# Patient Record
Sex: MALE | Race: WHITE | Employment: FULL TIME | URBAN - METROPOLITAN AREA
[De-identification: names, ages, dates, MRNs, and addresses within clinical notes are randomized per-mention and may not be internally consistent; named-entity substitution may affect disease eponyms.]

---

## 2020-01-13 ENCOUNTER — OFFICE VISIT (OUTPATIENT)
Dept: ONCOLOGY | Age: 60
End: 2020-01-13

## 2020-01-13 VITALS
TEMPERATURE: 98 F | RESPIRATION RATE: 18 BRPM | DIASTOLIC BLOOD PRESSURE: 77 MMHG | HEART RATE: 52 BPM | SYSTOLIC BLOOD PRESSURE: 122 MMHG | OXYGEN SATURATION: 99 % | WEIGHT: 160 LBS

## 2020-01-13 DIAGNOSIS — R59.0 AXILLARY ADENOPATHY: Primary | ICD-10-CM

## 2020-01-13 RX ORDER — SIMVASTATIN 20 MG/1
TABLET, FILM COATED ORAL
COMMUNITY
Start: 2019-12-24

## 2020-01-13 NOTE — PROGRESS NOTES
[facility]  [address] [facility], [address] [place] [address]  Satish Aryan: [phone]  F: [phone]      Reason for Visit:   Barbara Yi is a 61 y.o. male who is seen as a self referral for evaluation of axillary adenopathy. History of Present Illness:   Barbara Yi is a pleasant 61 y.o. male who presents today for evaluation of axillary adenopathy. He reports first noticing a lump in his right axilla about a month ago. It has remained stable in size since that time, about the size of a marble. Not painful or tender. He denies other adenopathy. He denies other symptoms. He feels well, good energy, no pain. He denies history of skin cancer, denies any concerning lesions on his skin. Denies recent infections. He did have shoulder surgery about 7 months ago. Paternal GF  of lymphoma. Maternal GF had myeloma. He is unaccompanied today. 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 His wife  2 years ago from uterine sarcoma. Past Medical History:   Diagnosis Date    HLD (hyperlipidemia)       Past Surgical History:   Procedure Laterality Date    HX SHOULDER ARTHROSCOPY Right 2019      Social History     Tobacco Use    Smoking status: Never Smoker    Smokeless tobacco: Never Used   Substance Use Topics    Alcohol use: Yes     Frequency: 2-3 times a week      Family History   Problem Relation Age of Onset    Heart Disease Father     Hypertension Father     Stroke Father     Cancer Maternal Grandfather         multiple myeloma    Cancer Paternal Grandmother         lyphoma     Current Outpatient Medications   Medication Sig    simvastatin (ZOCOR) 20 mg tablet      No current facility-administered medications for this visit.        Allergies   Allergen Reactions    Sulfa (Sulfonamide Antibiotics) Rash        Review of Systems: A complete review of systems was obtained, negative except as described above.    Physical Exam:     Visit Vitals  /77 (BP 1 Location: Right arm, BP Patient Position: Sitting)   Pulse (!) 52   Temp 98 °F (36.7 °C) (Temporal)   Resp 18   Wt 160 lb (72.6 kg)   SpO2 99%     General: No distress  Eyes: PERRLA, anicteric sclerae  HENT: Atraumatic, OP clear  Neck: Supple  Lymphatic: No cervical, supraclavicular, or inguinal adenopathy. Right axilla with 1cm nodule, nontender. Respiratory: CTAB, normal respiratory effort  CV: Normal rate, regular rhythm, no murmurs, no peripheral edema  GI: Soft, nontender, nondistended, no masses, no hepatomegaly, no splenomegaly  MS: Normal gait and station. Digits without clubbing or cyanosis. Skin: No rashes, ecchymoses, or petechiae. Normal temperature, turgor, and texture. Psych: Alert, oriented, appropriate affect, normal judgment/insight  Breast exam: No breast masses in bilateral breasts    Results:     N/A    Assessment:   1) Axillary adenopathy  Uncertain etiology. Possibly reactive, given his prior shoulder surgery, but I would have expected some improvement this far out. This is a typical drainage site of the breast, thoracic wall, and arm, and he has no obvious abnormalities of those areas. I recommend we start by obtaining an US of his axilla, along with an US guided needle biopsy. Based on this, we can consider further testing. He reports fairly recent CBC and chemistry panel with his PCP, which I will request.    Plan:     · Request records from PCP  · US axilla  · US guided biopsy of axillary node  · I will call with results    I appreciate the opportunity to participate in Mr. Zoraida gonzales.     Signed By: Samir Leong MD

## 2020-01-16 ENCOUNTER — HOSPITAL ENCOUNTER (OUTPATIENT)
Dept: ULTRASOUND IMAGING | Age: 60
Discharge: HOME | End: 2020-01-16
Attending: INTERNAL MEDICINE
Payer: COMMERCIAL

## 2020-01-16 DIAGNOSIS — R59.0 AXILLARY ADENOPATHY: ICD-10-CM

## 2020-01-16 PROCEDURE — 76882 US LMTD JT/FCL EVL NVASC XTR: CPT

## 2020-01-17 ENCOUNTER — TELEPHONE (OUTPATIENT)
Dept: ONCOLOGY | Age: 60
End: 2020-01-17

## 2020-01-17 DIAGNOSIS — R59.0 AXILLARY ADENOPATHY: Primary | ICD-10-CM

## 2020-01-17 NOTE — TELEPHONE ENCOUNTER
[facility] 88  [phone] report reviewed, small axillary node was found, not pathologic by size criteria. Radiologist recommended against biopsy. I called the discuss with the patient, no answer, left a message for him to call me back to discuss.

## 2020-01-20 ENCOUNTER — TELEPHONE (OUTPATIENT)
Dept: ONCOLOGY | Age: 60
End: 2020-01-20

## 2020-01-20 DIAGNOSIS — R59.0 AXILLARY ADENOPATHY: Primary | ICD-10-CM

## 2020-01-20 NOTE — TELEPHONE ENCOUNTER
[address] at [place] [address]  [phone]    I called Dr. Emmie Chen on Friday evening 1/17/2020 and discussed his axillary ultrasound findings. The node he is feeling was very small, normal by size criteria. Based on this, the radiologist opted not to pursue a biopsy. I reassured the patient  that this is a normal size node and therefore it is unlikely to represent malignancy. We could just follow this over time and ensure that it does not get any larger. However, he remains quite nervous about this node and he inquires about having a surgeon remove it now. He would like referral to a surgical oncologist.  Referral to Dr. Ivette Crane or Dr. Jenise Gipson at [facility] placed.

## 2020-01-20 NOTE — TELEPHONE ENCOUNTER
Patient requesting his US results to be released to [facility]    He also wants to know who he was going to be referred to for a surgeon at [facility] so he can expedite the referral and get an appt with them he wants to call them himself    Thanks    Bo Garces

## 2020-01-21 ENCOUNTER — TELEPHONE (OUTPATIENT)
Dept: ONCOLOGY | Age: 60
End: 2020-01-21

## 2020-01-21 NOTE — TELEPHONE ENCOUNTER
[address] at [place]  [phone]    01/21/20- New patient appointment scheduled with Dr. Sedrick Pena on 1/29 at 3:20 pm (arrive at 3), [facility],[address]. Patient notified of appointment information, but wasn't happy that the first available is 8 days away. He would like to call their office to see if he can get in sooner.

## 2020-01-21 NOTE — TELEPHONE ENCOUNTER
Dr. Max Mejia is calling for update on getting appt from the surgeon. He is very anxious about getting everything going.      # [phone]

## 2020-01-22 ENCOUNTER — OFFICE VISIT (OUTPATIENT)
Dept: SURGERY | Age: 60
End: 2020-01-22

## 2020-01-22 VITALS
SYSTOLIC BLOOD PRESSURE: 118 MMHG | WEIGHT: 157.5 LBS | TEMPERATURE: 98 F | HEART RATE: 67 BPM | HEIGHT: 67 IN | OXYGEN SATURATION: 98 % | RESPIRATION RATE: 16 BRPM | BODY MASS INDEX: 24.72 KG/M2 | DIASTOLIC BLOOD PRESSURE: 80 MMHG

## 2020-01-22 DIAGNOSIS — R59.0 AXILLARY ADENOPATHY: Primary | ICD-10-CM

## 2020-01-22 NOTE — PROGRESS NOTES
Subjective:      Dafne Mosley  is a 61 y.o.  male who was referred by Dr. Jennifer Stacy for evaluation of RIGHT axillary lymph adenopathy. Pt first notice RIGHT axillary enlarged LN a couple of weeks ago. US of the area showed normalcontour, however pt still concerned and decided to have it further evaluated. Pt remembered today that he had dry needling done for RIGHT shoulder pain, which ended around December 20th. Past Medical History:   Diagnosis Date    Axillary adenopathy 1/22/2020    HLD (hyperlipidemia)        Past Surgical History:   Procedure Laterality Date    HX SHOULDER ARTHROSCOPY Right 2019       Social History     Tobacco Use    Smoking status: Never Smoker    Smokeless tobacco: Never Used   Substance Use Topics    Alcohol use: Yes     Frequency: 2-3 times a week     Comment: beer/wine 4-5 per week       Family History   Problem Relation Age of Onset    Heart Disease Father     Hypertension Father     Stroke Father     Cancer Maternal Grandfather         multiple myeloma    Cancer Paternal Grandmother         lyphoma       Current Outpatient Medications on File Prior to Visit   Medication Sig Dispense Refill    simvastatin (ZOCOR) 20 mg tablet        No current facility-administered medications on file prior to visit. Allergies   Allergen Reactions    Sulfa (Sulfonamide Antibiotics) Rash       Review of Systems:    A comprehensive review of systems was negative except for that written in the History of Present Illness. Objective:     Visit Vitals  /80 (BP 1 Location: Right arm, BP Patient Position: Sitting)   Pulse 67   Temp 98 °F (36.7 °C) (Oral)   Resp 16   Ht 5' 7\" (1.702 m)   Wt 157 lb 8 oz (71.4 kg)   SpO2 98%   BMI 24.67 kg/m²        Physical Exam:  LYMPHATIC: 2.0 x 1.5 cm LN in the RIGHT axilla. Labs: No results found for this or any previous visit (from the past 24 hour(s)).     Data Review:      RIGHT axillary US 01/16/20:  Oval-shaped lymph node identified with minimal central fatty hilum. 9 mm in short axis XVI mm in long axis by 3 mm in size. Most likely representative of a benign axillary lymph node. Assessment and Plan:       ICD-10-CM ICD-9-CM    1. Axillary adenopathy R59.0 785. 6        Given new history information, reasonable to follow this as reactive LN. Will plan to re-check RIGHT axilla in 6 weeks. All questions were answered and pt is in agreement with this plan. This document was scribed by Ashley Stahl as dictated by Dr. Libby Johnson.      Signed By: Mai Saavedra MD     01/22/20

## 2020-01-22 NOTE — PROGRESS NOTES
1. Have you been to the ER, urgent care clinic since your last visit? Hospitalized since your last visit? No    2. Have you seen or consulted any other health care providers outside of the [address] [place] [address] since your last visit? Include any pap smears or colon screening.   No

## 2020-01-22 NOTE — LETTER
1/22/20 Patient: Solitario Yoder YOB: 1960 Date of Visit: 1/22/2020 Leyda Trinh MD 
[address] [facility] [place] [id number] [address] Facsimile: [phone] Dear Leyda Trinh MD, Thank you for referring Mr. Yamel Watts to [facility] [address] [facility] for evaluation. My notes for this consultation are attached. If you have questions, please do not hesitate to call me. I look forward to following your patient along with you. Sincerely, Ritu Lopez MD

## 2020-01-27 ENCOUNTER — TELEPHONE (OUTPATIENT)
Dept: ONCOLOGY | Age: 60
End: 2020-01-27

## 2020-01-27 DIAGNOSIS — R59.0 AXILLARY ADENOPATHY: Primary | ICD-10-CM

## 2020-01-27 NOTE — TELEPHONE ENCOUNTER
Patient will need ultrasound of right axillary node and follow up with Dr. Luis Manuel Estevez in 6 weeks from last ultrasound to monitor. Ultrasound ordered. Tab Asiat message to patient to notify and message to front office to schedule.

## 2020-01-27 NOTE — TELEPHONE ENCOUNTER
Patient stated that he has had his appt with Dr. Rachel Page and now wants to know what the next step is? Does he need appt ?  Please advises        CB # [phone]

## 2020-02-25 ENCOUNTER — HOSPITAL ENCOUNTER (OUTPATIENT)
Dept: ULTRASOUND IMAGING | Age: 60
Discharge: HOME | End: 2020-02-25
Attending: NURSE PRACTITIONER
Payer: COMMERCIAL

## 2020-02-25 DIAGNOSIS — R59.0 AXILLARY ADENOPATHY: ICD-10-CM

## 2020-02-25 PROCEDURE — 76882 US LMTD JT/FCL EVL NVASC XTR: CPT

## 2020-02-28 ENCOUNTER — OFFICE VISIT (OUTPATIENT)
Dept: ONCOLOGY | Age: 60
End: 2020-02-28

## 2020-02-28 VITALS
RESPIRATION RATE: 14 BRPM | TEMPERATURE: 97.2 F | HEART RATE: 50 BPM | HEIGHT: 67 IN | OXYGEN SATURATION: 99 % | WEIGHT: 158 LBS | SYSTOLIC BLOOD PRESSURE: 122 MMHG | DIASTOLIC BLOOD PRESSURE: 78 MMHG | BODY MASS INDEX: 24.8 KG/M2

## 2020-02-28 DIAGNOSIS — R59.0 AXILLARY ADENOPATHY: Primary | ICD-10-CM

## 2020-02-28 NOTE — PROGRESS NOTES
López Kee is a 61 y.o. male follow up for axillary adenopathy. 1. Have you been to the ER, urgent care clinic since your last visit? Hospitalized since your last visit?no     2. Have you seen or consulted any other health care providers outside of the [address] [place] [address] since your last visit? Include any pap smears or colon screening.  no

## 2020-02-28 NOTE — PROGRESS NOTES
[facility] at [address], [address] [facility] [address] [facility]  Kj Silva: [phone]  F: [phone]      Reason for Visit:   Santi Peguero is a 61 y.o. male who is seen for follow up of axillary adenopathy. History of Present Illness: We ordered an US of his axilla after his last visit, which revealed a normal size axillary node. Radiology declined to biopsy. The patient requested referral to surgical oncology, hoping for an excisional biopsy. He saw Dr. Kobi Freeman who recommended against a resection based on the normal size of the lymph node. He is here today to follow up on a repeat US. He is feeling well. He has been very busy with work related to the coronovirus outbreak. Axillary node remains notender, and he has not noticed significant increase. No other adenopathy. No fevers, chills, night sweats, unintentional weight loss. Paternal GF  of lymphoma. Maternal GF had myeloma. He is unaccompanied today. 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 His wife  2 years ago from uterine sarcoma. PAST HISTORY: The following sections were reviewed and updated in the EMR as appropriate: PMH, SH, FH, Medications, Allergies. Allergies   Allergen Reactions    Sulfa (Sulfonamide Antibiotics) Rash      Review of Systems: A complete review of systems was obtained, reviewed, and scanned into the EMR. Pertinent findings reviewed above. Physical Exam:     Visit Vitals  /78 (BP 1 Location: Left arm, BP Patient Position: Sitting)   Pulse (!) 50   Temp 97.2 °F (36.2 °C) (Temporal)   Resp 14   Ht 5' 7\" (1.702 m)   Wt 158 lb (71.7 kg)   SpO2 99%   BMI 24.75 kg/m²     General: No distress  Eyes: PERRLA, anicteric sclerae  HENT: Atraumatic, OP clear  Neck: Supple  Lymphatic: No cervical, supraclavicular, or inguinal adenopathy. Right axilla with 1cm nodule, nontender.   Respiratory: CTAB, normal respiratory effort  CV: Normal rate, regular rhythm, no murmurs, no peripheral edema  GI: Soft, nontender, nondistended, no masses, no hepatomegaly, no splenomegaly  MS: Normal gait and station. Digits without clubbing or cyanosis. Skin: No rashes, ecchymoses, or petechiae. Normal temperature, turgor, and texture. Psych: Alert, oriented, appropriate affect, normal judgment/insight  Breast exam: No breast masses in bilateral breasts    Results:     US right axilla 1/16/2020: There is a oval-shaped lymph node identified with minimal central fatty hilum. 9  mm in short axis XVI mm in long axis by 3 mm in size. Most likely representative  of a benign axillary lymph node. Percutaneous sampling was not undertaken. Continued clinical follow-up can be performed. If there is a change in size, or  the patient notices additional lymph nodes in this area repeat imaging of the  axilla can be performed. US right axilla 2/26/2020:  Palpable finding in the right axilla corresponds to a normal sized, morphologically normal lymph node which is stable or slightly decreased in size since the prior study. No lymphadenopathy or suspicious axillary mass is evident. Assessment/Recommendations:   1) Axillary adenopathy  Normal size and appearance on ultrasound from 1/16/2020. Both radiology and surgery declined to biopsy. Repeat ultrasound done 2/26/2020 again shows a normal appearing lymph node, no growth. I provided the patient with reassurance. I recommend further imaging only as indicated (ie if not becomes painful or grows or other adenopathy develops).         Signed By: Natasha Quiroz MD

## 2020-10-27 ENCOUNTER — TRANSCRIBE ORDER (OUTPATIENT)
Dept: SCHEDULING | Age: 60
End: 2020-10-27

## 2020-10-27 DIAGNOSIS — R04.1 HEMORRHAGE FROM THROAT: Primary | ICD-10-CM

## 2020-11-03 ENCOUNTER — HOSPITAL ENCOUNTER (OUTPATIENT)
Dept: CT IMAGING | Age: 60
Discharge: HOME | End: 2020-11-03
Attending: SPECIALIST
Payer: COMMERCIAL

## 2020-11-03 DIAGNOSIS — R04.1 HEMORRHAGE FROM THROAT: ICD-10-CM

## 2020-11-03 PROCEDURE — 70486 CT MAXILLOFACIAL W/O DYE: CPT

## 2022-03-19 PROBLEM — R59.0 AXILLARY ADENOPATHY: Status: ACTIVE | Noted: 2020-01-22

## 2023-04-07 ENCOUNTER — TRANSCRIBE ORDER (OUTPATIENT)
Dept: SCHEDULING | Age: 63
End: 2023-04-07

## 2023-04-23 DIAGNOSIS — E78.2 MIXED HYPERLIPIDEMIA: Primary | ICD-10-CM

## 2023-05-16 ENCOUNTER — HOSPITAL ENCOUNTER (OUTPATIENT)
Facility: HOSPITAL | Age: 63
Discharge: HOME | End: 2023-05-19

## 2023-05-16 DIAGNOSIS — I25.10 ATHEROSCLEROSIS OF NATIVE CORONARY ARTERY OF NATIVE HEART WITHOUT ANGINA PECTORIS: ICD-10-CM

## 2023-05-16 DIAGNOSIS — E78.49 OTHER HYPERLIPIDEMIA: ICD-10-CM

## 2023-05-16 PROCEDURE — 75571 CT HRT W/O DYE W/CA TEST: CPT

## 2023-05-25 RX ORDER — SIMVASTATIN 20 MG
TABLET ORAL
COMMUNITY
Start: 2019-12-24